# Patient Record
(demographics unavailable — no encounter records)

---

## 2024-11-05 NOTE — PHYSICAL EXAM
[No JVD] : no jugular venous distention [No Edema] : there was no peripheral edema [Soft] : abdomen soft [Non Tender] : non-tender [Non-distended] : non-distended [Normal Bowel Sounds] : normal bowel sounds [No CVA Tenderness] : no CVA  tenderness [Normal] : affect was normal and insight and judgment were intact

## 2024-11-06 NOTE — HEALTH RISK ASSESSMENT
[0] : 2) Feeling down, depressed, or hopeless: Not at all (0) [PHQ-9 Negative - No further assessment needed] : PHQ-9 Negative - No further assessment needed [Never] : Never [[id number]] : 0

## 2024-11-06 NOTE — REVIEW OF SYSTEMS
[Fever] : no fever [Chills] : no chills [Discharge] : no discharge [Chest Pain] : no chest pain [Palpitations] : no palpitations [Lower Ext Edema] : no lower extremity edema [Shortness Of Breath] : no shortness of breath [Wheezing] : no wheezing [Cough] : no cough [Abdominal Pain] : no abdominal pain [Nausea] : no nausea [Vomiting] : no vomiting [Joint Pain] : no joint pain [Headache] : no headache [Dizziness] : no dizziness [Unsteady Walk] : no ataxia

## 2024-11-06 NOTE — HISTORY OF PRESENT ILLNESS
[FreeTextEntry1] : Pt states he is here for a follow up for his blood pressure. [de-identified] : 48 M with hx HTN, sleep apnea on CPAP, obesity here for BP check and weight loss discussion. Patient was recently increased to lisinopril 10mg (from 5mg) and has been taking it over the past few months. BP today in office 122/78 and he has been well controlled on this new dose. Has no complaints or concerns about his BP medication, no side effects. Denies chest pain, SOB. Patient also would like to discuss starting weight loss medication. States that his wife is on Wegovy and would like to try for weight loss.

## 2024-11-06 NOTE — HISTORY OF PRESENT ILLNESS
[FreeTextEntry1] : Pt states he is here for a follow up for his blood pressure. [de-identified] : 48 M with hx HTN, sleep apnea on CPAP, obesity here for BP check and weight loss discussion. Patient was recently increased to lisinopril 10mg (from 5mg) and has been taking it over the past few months. BP today in office 122/78 and he has been well controlled on this new dose. Has no complaints or concerns about his BP medication, no side effects. Denies chest pain, SOB. Patient also would like to discuss starting weight loss medication. States that his wife is on Wegovy and would like to try for weight loss.

## 2024-11-06 NOTE — HEALTH RISK ASSESSMENT
[0] : 2) Feeling down, depressed, or hopeless: Not at all (0) [PHQ-9 Negative - No further assessment needed] : PHQ-9 Negative - No further assessment needed [Never] : Never [[id number]] : 0

## 2024-12-02 NOTE — HISTORY OF PRESENT ILLNESS
[Home] : at home, [unfilled] , at the time of the visit. [Verbal consent obtained from patient] : the patient, [unfilled] [FreeTextEntry1] : medication follow up [de-identified] : 48 M with hx HTN, sleep apnea on CPAP, obesity presenting for follow up after initiation of Wegovy and BP check. Started on 11/23. Has been having regular BMs. Snacking less and feels full more quickly. Denies diarrhea, abdominal pain and nausea and vomiting. Wanted to know if the prescription was a 1 month or a 3 month. Has been checking BP at home and SBP 120s with diastolic in the 80s while on lisinopril 10 mg. Reports recording BPs daily at the same time.   Discussed with patient: You have chosen to receive care through the use of tele-media. Tele-media enables health care providers at different locations to provide safe, effective, and convenient care through the use of technology. Please note this is a billable encounter. As with any health care service, there are risks associated with the use of tele-media, including equipment failure, poor image and/or resolution, and  issues. You understand that I cannot physically examine you and that you may need to come to the clinic to complete the assessment. Patient agreed verbally to understanding the risks and benefits of tele-media as explained. All questions regarding tele-media encounters were answered.

## 2024-12-02 NOTE — ASSESSMENT
[FreeTextEntry1] : RTC 2 weeks after initiatoin of 0.5 mg Wegovy, early January.  Discussed case with Dr. Romero

## 2024-12-02 NOTE — END OF VISIT
[FreeTextEntry3] : I was present with the Resident during the key portions of this encounter and provided supervision via audio/visual technology.  I agree with the findings and plan as documented in the Resident's note, unless noted below.  48-year-old male presenting via telehealth for follow-up of Wegovy.  He is tolerating 0.25 mg dose which she has been taking for the last 2 weeks.  He unfortunately was charged for a 3-month supply by his insurance but he only received 4 pens which we confirmed is what we prescribed on November 12 and is a 1 month supply.  He will continue the 0.25 for the next 2 weeks and then start 0 point weeks and 5 mg weekly, we sent a 3-month supply of the 0.5 mg dose of Wegovy to his mail order pharmacy.  Of note, his insurance is switching to a new mail order pharmacy ([facility]) in the new year. RTC 4-6 weeks for f/u above or earlier prn.  [Time Spent: ___ minutes] : I have spent [unfilled] minutes of time on the encounter which excludes teaching and separately reported services.

## 2025-02-10 NOTE — ASSESSMENT
[FreeTextEntry1] : 48 M with PMHx of HTN, sleep apnea on CPAP, obesity presenting for follow up for Wegovy dose increase. Will have follow-up telehealth visit in 4 weeks for 1->1.7mg increase of Wegovy.

## 2025-02-10 NOTE — HISTORY OF PRESENT ILLNESS
[Home] : at home, [unfilled] , at the time of the visit. [Medical Office: ([facility])___] : at the medical office located in  [Telehealth (audio & video)] : This visit was provided via telehealth using real-time 2-way audio visual technology. [Verbal consent obtained from patient] : the patient, [unfilled] [FreeTextEntry1] : Follow-up  [de-identified] : 48 M with PMHx of HTN, sleep apnea on CPAP, obesity presenting for follow up for Wegovy dose increase. Patient is currently on his 0.5mg SQ weekly dosing with which he does not report any N/V, epigastric pain or abdominal pain of any kind, constipation or diarrhea. Patient has notes a decreased appetite but is tolerating well. Last weighed himself ~7 days ago at 238.6 which is ~9-pound loss since last weight in clinic on Nov '24. Patient repots adherence to his lisinopril 10mg PO daily w/ intermittent BP measurement at home SBPs consistently <140.

## 2025-02-10 NOTE — PLAN
[FreeTextEntry1] : #Class I obesity E66.811 Patient currently on wegovy 0.5mg SQ weekly dose w/ ~9-pound loss since last measured in clinic on Nov '24. Patient does not report any concerning GI symptoms and reports tolerable decrease in his appetite.  Plan: - Increase wegovy 0.5->1mg SQ weekly; will have follow-up telehealth visit in 4 weeks to increase dose further to 1.7mg SQ weekly  #HTN I10 Patient reports adherence to lisinopril 10mg PO daily w/ BP at home consistently <140.  Plan: - C/w lisinopril 10mg PO daily

## 2025-02-10 NOTE — END OF VISIT
[] : Resident [FreeTextEntry3] : #obesity- increase wegovy- no issues  #htn- cont lisinopril #clarita- increase weogyv

## 2025-02-10 NOTE — HISTORY OF PRESENT ILLNESS
[Home] : at home, [unfilled] , at the time of the visit. [Medical Office: ([facility])___] : at the medical office located in  [Telehealth (audio & video)] : This visit was provided via telehealth using real-time 2-way audio visual technology. [Verbal consent obtained from patient] : the patient, [unfilled] [FreeTextEntry1] : Follow-up  [de-identified] : 48 M with PMHx of HTN, sleep apnea on CPAP, obesity presenting for follow up for Wegovy dose increase. Patient is currently on his 0.5mg SQ weekly dosing with which he does not report any N/V, epigastric pain or abdominal pain of any kind, constipation or diarrhea. Patient has notes a decreased appetite but is tolerating well. Last weighed himself ~7 days ago at 238.6 which is ~9-pound loss since last weight in clinic on Nov '24. Patient repots adherence to his lisinopril 10mg PO daily w/ intermittent BP measurement at home SBPs consistently <140.

## 2025-02-25 NOTE — PHYSICAL EXAM
[No Acute Distress] : no acute distress [Normal Affect] : the affect was normal [Normal Insight/Judgement] : insight and judgment were intact [de-identified] : limited over telephonic visit

## 2025-02-25 NOTE — ASSESSMENT
[FreeTextEntry1] : #Obesity on wegovy Has been on wegovy 1mg for 2 weeks with no issues. - additional 4 weeks of 1mg sent to [facility] on top of the 2 weeks he has remaining - 6 week follow up

## 2025-02-25 NOTE — REVIEW OF SYSTEMS
[Recent Change In Weight] : ~T recent weight change [Fever] : no fever [Chills] : no chills [Night Sweats] : no night sweats [Vision Problems] : no vision problems [Chest Pain] : no chest pain [Palpitations] : no palpitations [Shortness Of Breath] : no shortness of breath [Abdominal Pain] : no abdominal pain [Nausea] : no nausea [Vomiting] : no vomiting [Heartburn] : no heartburn

## 2025-02-25 NOTE — HISTORY OF PRESENT ILLNESS
[Home] : at home, [unfilled] , at the time of the visit. [Medical Office: ([facility])___] : at the medical office located in  [Telephone (audio)] : This telephonic visit was provided via audio only technology. [Patient preference] : patient preference. [Verbal consent obtained from patient] : the patient, [unfilled] [FreeTextEntry1] : 2 week follow up [de-identified] : 48 M hx HTN, sleep apnea on CPAP, obesity on telephonic visit for recent increase in dose of Wegovy. Recent dose increase to 1mg weekly which he has been tolerating well with no side effects including N/V, dizziness. Reports decreased appetite and has been eating less. Has not weighed himself in some time, however today's weight on home scale is 236.5. Pt informed to continue taking his weight as he uses his medication.

## 2025-04-10 NOTE — END OF VISIT
[] : Resident [FreeTextEntry3] : Pt seen with Dr. Michaud, agree with assessment and plan as noted regarding obesity, HTN, and lipids.  will see patient in office nect month for re-evaluation.  meds sent.

## 2025-04-10 NOTE — HISTORY OF PRESENT ILLNESS
[Home] : at home, [unfilled] , at the time of the visit. [Medical Office: ([facility])___] : at the medical office located in  [Telehealth (audio & video)] : This visit was provided via telehealth using real-time 2-way audio visual technology. [Verbal consent obtained from patient] : the patient, [unfilled] [FreeTextEntry1] : medication refill [de-identified] : 47 y/o M with PMH of obesity, HTN, HLD, prediabetes, HAYDE on CPAP, presenting via telehealth visit for follow-up visit regarding wegovy. Pt was started on wegovy 0.25mg SQ weekly in 11/2024 and was slowly uptitrated to 1mg. He has been on 1mg SQ weekly for 2 months and just ran out of pens. Pt is tolerating the medication well, has no side effects. Denies abd pain, n/v/c/d, heartburn. He does note decreased appetite and feels full faster, is now eating 2 meals per day and eating a healthier diet. Notes weight loss, is now 235 pounds, has lost about 12 pounds since starting wegovy in 11/2024. Would like to increase dose to 1.7mg. Patient would also like a refill on his lisinopril. No other complaints at this time.

## 2025-04-10 NOTE — REVIEW OF SYSTEMS
[Negative] : Heme/Lymph [Fever] : no fever [Chest Pain] : no chest pain [Shortness Of Breath] : no shortness of breath [Abdominal Pain] : no abdominal pain [Nausea] : no nausea [Constipation] : no constipation [Diarrhea] : no diarrhea [Vomiting] : no vomiting [Heartburn] : no heartburn

## 2025-04-10 NOTE — ASSESSMENT
[FreeTextEntry1] : 49 y/o M with PMH of obesity, HTN, HLD, prediabetes, HAYDE on CPAP, presenting via telehealth visit for follow-up visit regarding wegovy.  #Obesity BMI ~34 in 11/2024, started on wegovy and uptitrated to 1mg SQ weekly, has been on this dose for 2 months and tolerating it well. Reportedly lost 12 pounds since 11/2024. - encouraged lifestyle and dietary modifications - increase wegovy from 1mg to 1.7mg SQ weekly -- sent 1 month with 1 refill - RTC in person in 1 month for weight check and to assess tolerance -- consider increasing to 2.4mg at that time  #HTN BMP 7/2024 wnl. - refilled lisinopril 10mg PO qd - obtain urine albumin/Cr ratio at next visit  #HLD Lipid profile 7/2024 , HDL 42, , tot chol 219. Has never been on a statin. Never smoker, no hx of DM, but has HTN. 10-year ASCVD risk is 4.23% (low risk). - obtain repeat lipid profile at next visit  #Prediabetes A1C 7/2024 6.1. - encouraged lifestyle and dietary modifications  #HAYDE on CPAP - c/w CPAP overnight  #HCM - flu vaccine 11/2024 UTD - discuss vaccines at next visit: PCV20, tdap, updated COVID - c-scope 8/2023 unremarkable -- repeat in 10 years - never smoker - discuss routine STI screening at next visit   RTC in-person in 1 month for weight check, assess wegovy tolerance/increase dose, labs, etc.   Patient seen and evaluated with attending physician Dr. Sneed.

## 2025-04-10 NOTE — HISTORY OF PRESENT ILLNESS
[Home] : at home, [unfilled] , at the time of the visit. [Medical Office: ([facility])___] : at the medical office located in  [Telehealth (audio & video)] : This visit was provided via telehealth using real-time 2-way audio visual technology. [Verbal consent obtained from patient] : the patient, [unfilled] [FreeTextEntry1] : medication refill [de-identified] : 49 y/o M with PMH of obesity, HTN, HLD, prediabetes, HAYDE on CPAP, presenting via telehealth visit for follow-up visit regarding wegovy. Pt was started on wegovy 0.25mg SQ weekly in 11/2024 and was slowly uptitrated to 1mg. He has been on 1mg SQ weekly for 2 months and just ran out of pens. Pt is tolerating the medication well, has no side effects. Denies abd pain, n/v/c/d, heartburn. He does note decreased appetite and feels full faster, is now eating 2 meals per day and eating a healthier diet. Notes weight loss, is now 235 pounds, has lost about 12 pounds since starting wegovy in 11/2024. Would like to increase dose to 1.7mg. Patient would also like a refill on his lisinopril. No other complaints at this time.

## 2025-06-25 NOTE — HISTORY OF PRESENT ILLNESS
[Home] : at home, [unfilled] , at the time of the visit. [Medical Office: ([facility])___] : at the medical office located in  [Telehealth (audio & video)] : This visit was provided via telehealth using real-time 2-way audio visual technology. [Verbal consent obtained from patient] : the patient, [unfilled] [de-identified] : 48 y/o M with PMH of obesity, HTN, HLD, prediabetes, HAYDE on CPAP, presenting via telehealth visit for follow-up visit regarding wegovy.   #Weight loss management: Initially on wegovy 0.25mg SQ weekly in 11/2024 and was slowly up-titrated to 1.7mg. Tolerant on medication.  No Side effects no abd pain, n/v/c/d, heartburn. Notes that he has suppressed appetite and feels full. Has been eating smaller meals. Early satiety. Diet: meals: mentions improvements in diet, eats lots of asparagus, corn and salads. He believes there is room for improvement. Has maybe one cheat day a week.  He works outdoors and stays active. Mentions that his last weight 230 this week.    Patient was recommended for in patient visit today to have lab work conducted however televisit set up.

## 2025-06-25 NOTE — PHYSICAL EXAM
[Normal] : no acute distress, well nourished, well developed and well-appearing [Normal Affect] : the affect was normal [Normal Insight/Judgement] : insight and judgment were intact

## 2025-06-25 NOTE — END OF VISIT
[] : Resident [FreeTextEntry3] : Wegovy follow-up. Due for labs but wasn't able to come in today; he will return for labs at his CPE in July. a1c, lipids, CBC, CMP, UACR, TSH Currently on 1.7mg Wegovy with no AE with subjective 17 pounds over 6-7 months. Continue GLP-1 therapy for metabolic health. Stay at  this dose and increase to 2.4mg if he fails to achieve average 0.5 pound weight loss per week. Maintains active lifestyle and reducing portions with vegetables.

## 2025-06-25 NOTE — HISTORY OF PRESENT ILLNESS
[Home] : at home, [unfilled] , at the time of the visit. [Medical Office: ([facility])___] : at the medical office located in  [Telehealth (audio & video)] : This visit was provided via telehealth using real-time 2-way audio visual technology. [Verbal consent obtained from patient] : the patient, [unfilled] [de-identified] : 50 y/o M with PMH of obesity, HTN, HLD, prediabetes, HAYDE on CPAP, presenting via telehealth visit for follow-up visit regarding wegovy.   #Weight loss management: Initially on wegovy 0.25mg SQ weekly in 11/2024 and was slowly up-titrated to 1.7mg. Tolerant on medication.  No Side effects no abd pain, n/v/c/d, heartburn. Notes that he has suppressed appetite and feels full. Has been eating smaller meals. Early satiety. Diet: meals: mentions improvements in diet, eats lots of asparagus, corn and salads. He believes there is room for improvement. Has maybe one cheat day a week.  He works outdoors and stays active. Mentions that his last weight 230 this week.    Patient was recommended for in patient visit today to have lab work conducted however televisit set up.

## 2025-06-25 NOTE — REVIEW OF SYSTEMS
[Negative] : Heme/Lymph [Chest Pain] : no chest pain [Palpitations] : no palpitations [Lower Ext Edema] : no lower extremity edema [Shortness Of Breath] : no shortness of breath [Wheezing] : no wheezing [Dyspnea on Exertion] : not dyspnea on exertion [Abdominal Pain] : no abdominal pain [Nausea] : no nausea [Constipation] : no constipation [Diarrhea] : no diarrhea [Vomiting] : no vomiting [Heartburn] : no heartburn [Incontinence] : no incontinence [Hesitancy] : no hesitancy [Joint Pain] : no joint pain [Muscle Pain] : no muscle pain [Back Pain] : no back pain [Itching] : no itching [Headache] : no headache [Dizziness] : no dizziness [Fainting] : no fainting [Confusion] : no confusion

## 2025-07-03 NOTE — END OF VISIT
[] : Resident [Time Spent: ___ minutes] : I have spent [unfilled] minutes of time on the encounter which excludes teaching and separately reported services. [FreeTextEntry3] : 50 yo male with hx HAYDE, here for f/u GLP  1) overweight - chronic, no significant improvement on wegovy. will trial zepbound as he has a diagnosis of moderate-severe HAYDE and is currently on CPAP.

## 2025-07-03 NOTE — HISTORY OF PRESENT ILLNESS
[Home] : at home, [unfilled] , at the time of the visit. [Medical Office: ([facility])___] : at the medical office located in  [Telehealth (audio & video)] : This visit was provided via telehealth using real-time 2-way audio visual technology. [Verbal consent obtained from patient] : the patient, [unfilled] [FreeTextEntry1] : f/u [de-identified] : 50 y/o M with PMH of obesity, HTN, HLD, prediabetes, HAYDE on CPAP, presenting via telehealth visit for follow-up visit regarding obesity/HAYDE.  Pt's with no significant weight changes on Wegovy, insurance no longer covering medication. He has been eating healthier with more leafy greens incorporated in his diet. Per patient, he has lost 4lbs in the past month, currently 226 from 230. He is tolerating the medication well with no adverse effect. His last Wegovy was 5d ago and he does not have any more doses left. He also has chronic moderate to severe sleep apnea, and uses his CPAP daily. He has undergone multiple nasal procedures in the past to improve his sleep apnea. His last sleep study was done about 4y ago per patient. No other acute concerns or complaints today.

## 2025-07-03 NOTE — HISTORY OF PRESENT ILLNESS
[Home] : at home, [unfilled] , at the time of the visit. [Medical Office: ([facility])___] : at the medical office located in  [Telehealth (audio & video)] : This visit was provided via telehealth using real-time 2-way audio visual technology. [Verbal consent obtained from patient] : the patient, [unfilled] [FreeTextEntry1] : f/u [de-identified] : 50 y/o M with PMH of obesity, HTN, HLD, prediabetes, HAYDE on CPAP, presenting via telehealth visit for follow-up visit regarding obesity/HAYDE.  Pt's with no significant weight changes on Wegovy, insurance no longer covering medication. He has been eating healthier with more leafy greens incorporated in his diet. Per patient, he has lost 4lbs in the past month, currently 226 from 230. He is tolerating the medication well with no adverse effect. His last Wegovy was 5d ago and he does not have any more doses left. He also has chronic moderate to severe sleep apnea, and uses his CPAP daily. He has undergone multiple nasal procedures in the past to improve his sleep apnea. His last sleep study was done about 4y ago per patient. No other acute concerns or complaints today.

## 2025-07-03 NOTE — END OF VISIT
[] : Resident [Time Spent: ___ minutes] : I have spent [unfilled] minutes of time on the encounter which excludes teaching and separately reported services. [FreeTextEntry3] : 48 yo male with hx HAYDE, here for f/u GLP  1) overweight - chronic, no significant improvement on wegovy. will trial zepbound as he has a diagnosis of moderate-severe HAYDE and is currently on CPAP.